# Patient Record
Sex: FEMALE | Race: WHITE | NOT HISPANIC OR LATINO
[De-identification: names, ages, dates, MRNs, and addresses within clinical notes are randomized per-mention and may not be internally consistent; named-entity substitution may affect disease eponyms.]

---

## 2017-10-18 ENCOUNTER — TRANSCRIPTION ENCOUNTER (OUTPATIENT)
Age: 32
End: 2017-10-18

## 2020-05-14 ENCOUNTER — INPATIENT (INPATIENT)
Facility: HOSPITAL | Age: 35
LOS: 0 days | Discharge: HOME | End: 2020-05-15
Attending: OBSTETRICS & GYNECOLOGY | Admitting: OBSTETRICS & GYNECOLOGY
Payer: COMMERCIAL

## 2020-05-14 VITALS — WEIGHT: 163.14 LBS | HEIGHT: 63 IN

## 2020-05-14 DIAGNOSIS — O48.1 PROLONGED PREGNANCY: ICD-10-CM

## 2020-05-14 DIAGNOSIS — Z3A.39 39 WEEKS GESTATION OF PREGNANCY: ICD-10-CM

## 2020-05-14 LAB
BASOPHILS # BLD AUTO: 0.02 K/UL — SIGNIFICANT CHANGE UP (ref 0–0.2)
BASOPHILS NFR BLD AUTO: 0.2 % — SIGNIFICANT CHANGE UP (ref 0–2)
BLD GP AB SCN SERPL QL: NEGATIVE — SIGNIFICANT CHANGE UP
EOSINOPHIL # BLD AUTO: 0.05 K/UL — SIGNIFICANT CHANGE UP (ref 0–0.5)
EOSINOPHIL NFR BLD AUTO: 0.6 % — SIGNIFICANT CHANGE UP (ref 0–6)
HCT VFR BLD CALC: 38.1 % — SIGNIFICANT CHANGE UP (ref 34.5–45)
HGB BLD-MCNC: 12.6 G/DL — SIGNIFICANT CHANGE UP (ref 11.5–15.5)
IMM GRANULOCYTES NFR BLD AUTO: 0.4 % — SIGNIFICANT CHANGE UP (ref 0–1.5)
LYMPHOCYTES # BLD AUTO: 2.38 K/UL — SIGNIFICANT CHANGE UP (ref 1–3.3)
LYMPHOCYTES # BLD AUTO: 26.5 % — SIGNIFICANT CHANGE UP (ref 13–44)
MCHC RBC-ENTMCNC: 29.2 PG — SIGNIFICANT CHANGE UP (ref 27–34)
MCHC RBC-ENTMCNC: 33.1 GM/DL — SIGNIFICANT CHANGE UP (ref 32–36)
MCV RBC AUTO: 88.4 FL — SIGNIFICANT CHANGE UP (ref 80–100)
MONOCYTES # BLD AUTO: 0.87 K/UL — SIGNIFICANT CHANGE UP (ref 0–0.9)
MONOCYTES NFR BLD AUTO: 9.7 % — SIGNIFICANT CHANGE UP (ref 2–14)
NEUTROPHILS # BLD AUTO: 5.63 K/UL — SIGNIFICANT CHANGE UP (ref 1.8–7.4)
NEUTROPHILS NFR BLD AUTO: 62.6 % — SIGNIFICANT CHANGE UP (ref 43–77)
NRBC # BLD: 0 /100 WBCS — SIGNIFICANT CHANGE UP (ref 0–0)
PLATELET # BLD AUTO: 132 K/UL — LOW (ref 150–400)
RBC # BLD: 4.31 M/UL — SIGNIFICANT CHANGE UP (ref 3.8–5.2)
RBC # FLD: 13.2 % — SIGNIFICANT CHANGE UP (ref 10.3–14.5)
RH IG SCN BLD-IMP: POSITIVE — SIGNIFICANT CHANGE UP
SARS-COV-2 RNA SPEC QL NAA+PROBE: SIGNIFICANT CHANGE UP
T PALLIDUM AB TITR SER: NEGATIVE — SIGNIFICANT CHANGE UP
WBC # BLD: 8.99 K/UL — SIGNIFICANT CHANGE UP (ref 3.8–10.5)
WBC # FLD AUTO: 8.99 K/UL — SIGNIFICANT CHANGE UP (ref 3.8–10.5)

## 2020-05-14 RX ORDER — ACETAMINOPHEN 500 MG
975 TABLET ORAL
Refills: 0 | Status: DISCONTINUED | OUTPATIENT
Start: 2020-05-14 | End: 2020-05-15

## 2020-05-14 RX ORDER — SODIUM CHLORIDE 9 MG/ML
1000 INJECTION, SOLUTION INTRAVENOUS
Refills: 0 | Status: DISCONTINUED | OUTPATIENT
Start: 2020-05-14 | End: 2020-05-14

## 2020-05-14 RX ORDER — OXYCODONE HYDROCHLORIDE 5 MG/1
5 TABLET ORAL
Refills: 0 | Status: DISCONTINUED | OUTPATIENT
Start: 2020-05-14 | End: 2020-05-15

## 2020-05-14 RX ORDER — PRAMOXINE HYDROCHLORIDE 150 MG/15G
1 AEROSOL, FOAM RECTAL EVERY 4 HOURS
Refills: 0 | Status: DISCONTINUED | OUTPATIENT
Start: 2020-05-14 | End: 2020-05-15

## 2020-05-14 RX ORDER — SODIUM CHLORIDE 9 MG/ML
3 INJECTION INTRAMUSCULAR; INTRAVENOUS; SUBCUTANEOUS EVERY 8 HOURS
Refills: 0 | Status: DISCONTINUED | OUTPATIENT
Start: 2020-05-14 | End: 2020-05-15

## 2020-05-14 RX ORDER — CITRIC ACID/SODIUM CITRATE 300-500 MG
5 SOLUTION, ORAL ORAL ONCE
Refills: 0 | Status: DISCONTINUED | OUTPATIENT
Start: 2020-05-14 | End: 2020-05-14

## 2020-05-14 RX ORDER — TETANUS TOXOID, REDUCED DIPHTHERIA TOXOID AND ACELLULAR PERTUSSIS VACCINE, ADSORBED 5; 2.5; 8; 8; 2.5 [IU]/.5ML; [IU]/.5ML; UG/.5ML; UG/.5ML; UG/.5ML
0.5 SUSPENSION INTRAMUSCULAR ONCE
Refills: 0 | Status: DISCONTINUED | OUTPATIENT
Start: 2020-05-14 | End: 2020-05-15

## 2020-05-14 RX ORDER — AER TRAVELER 0.5 G/1
1 SOLUTION RECTAL; TOPICAL EVERY 4 HOURS
Refills: 0 | Status: DISCONTINUED | OUTPATIENT
Start: 2020-05-14 | End: 2020-05-15

## 2020-05-14 RX ORDER — MAGNESIUM HYDROXIDE 400 MG/1
30 TABLET, CHEWABLE ORAL
Refills: 0 | Status: DISCONTINUED | OUTPATIENT
Start: 2020-05-14 | End: 2020-05-15

## 2020-05-14 RX ORDER — OXYCODONE HYDROCHLORIDE 5 MG/1
5 TABLET ORAL ONCE
Refills: 0 | Status: DISCONTINUED | OUTPATIENT
Start: 2020-05-14 | End: 2020-05-15

## 2020-05-14 RX ORDER — IBUPROFEN 200 MG
600 TABLET ORAL EVERY 6 HOURS
Refills: 0 | Status: COMPLETED | OUTPATIENT
Start: 2020-05-14 | End: 2021-04-12

## 2020-05-14 RX ORDER — DIPHENHYDRAMINE HCL 50 MG
25 CAPSULE ORAL EVERY 6 HOURS
Refills: 0 | Status: DISCONTINUED | OUTPATIENT
Start: 2020-05-14 | End: 2020-05-15

## 2020-05-14 RX ORDER — OXYTOCIN 10 UNIT/ML
333.33 VIAL (ML) INJECTION
Qty: 20 | Refills: 0 | Status: DISCONTINUED | OUTPATIENT
Start: 2020-05-14 | End: 2020-05-15

## 2020-05-14 RX ORDER — KETOROLAC TROMETHAMINE 30 MG/ML
30 SYRINGE (ML) INJECTION ONCE
Refills: 0 | Status: DISCONTINUED | OUTPATIENT
Start: 2020-05-14 | End: 2020-05-14

## 2020-05-14 RX ORDER — LANOLIN
1 OINTMENT (GRAM) TOPICAL EVERY 6 HOURS
Refills: 0 | Status: DISCONTINUED | OUTPATIENT
Start: 2020-05-14 | End: 2020-05-15

## 2020-05-14 RX ORDER — HYDROCORTISONE 1 %
1 OINTMENT (GRAM) TOPICAL EVERY 6 HOURS
Refills: 0 | Status: DISCONTINUED | OUTPATIENT
Start: 2020-05-14 | End: 2020-05-15

## 2020-05-14 RX ORDER — OXYTOCIN 10 UNIT/ML
4 VIAL (ML) INJECTION
Qty: 30 | Refills: 0 | Status: DISCONTINUED | OUTPATIENT
Start: 2020-05-14 | End: 2020-05-14

## 2020-05-14 RX ORDER — OXYTOCIN 10 UNIT/ML
2 VIAL (ML) INJECTION
Qty: 30 | Refills: 0 | Status: DISCONTINUED | OUTPATIENT
Start: 2020-05-14 | End: 2020-05-15

## 2020-05-14 RX ORDER — SIMETHICONE 80 MG/1
80 TABLET, CHEWABLE ORAL EVERY 4 HOURS
Refills: 0 | Status: DISCONTINUED | OUTPATIENT
Start: 2020-05-14 | End: 2020-05-15

## 2020-05-14 RX ORDER — BENZOCAINE 10 %
1 GEL (GRAM) MUCOUS MEMBRANE EVERY 6 HOURS
Refills: 0 | Status: DISCONTINUED | OUTPATIENT
Start: 2020-05-14 | End: 2020-05-15

## 2020-05-14 RX ORDER — DIBUCAINE 1 %
1 OINTMENT (GRAM) RECTAL EVERY 6 HOURS
Refills: 0 | Status: DISCONTINUED | OUTPATIENT
Start: 2020-05-14 | End: 2020-05-15

## 2020-05-14 RX ORDER — CITRIC ACID/SODIUM CITRATE 300-500 MG
15 SOLUTION, ORAL ORAL EVERY 6 HOURS
Refills: 0 | Status: DISCONTINUED | OUTPATIENT
Start: 2020-05-14 | End: 2020-05-14

## 2020-05-14 RX ADMIN — Medication 30 MILLIGRAM(S): at 20:24

## 2020-05-14 RX ADMIN — Medication 15 MILLILITER(S): at 04:10

## 2020-05-14 RX ADMIN — Medication 1000 MILLIUNIT(S)/MIN: at 18:18

## 2020-05-14 RX ADMIN — Medication 2 MILLIUNIT(S)/MIN: at 08:57

## 2020-05-15 ENCOUNTER — TRANSCRIPTION ENCOUNTER (OUTPATIENT)
Age: 35
End: 2020-05-15

## 2020-05-15 VITALS
TEMPERATURE: 98 F | SYSTOLIC BLOOD PRESSURE: 95 MMHG | HEART RATE: 66 BPM | OXYGEN SATURATION: 98 % | DIASTOLIC BLOOD PRESSURE: 61 MMHG | RESPIRATION RATE: 18 BRPM

## 2020-05-15 PROCEDURE — 59025 FETAL NON-STRESS TEST: CPT

## 2020-05-15 PROCEDURE — 86850 RBC ANTIBODY SCREEN: CPT

## 2020-05-15 PROCEDURE — 86780 TREPONEMA PALLIDUM: CPT

## 2020-05-15 PROCEDURE — 86901 BLOOD TYPING SEROLOGIC RH(D): CPT

## 2020-05-15 PROCEDURE — 59050 FETAL MONITOR W/REPORT: CPT

## 2020-05-15 PROCEDURE — 86900 BLOOD TYPING SEROLOGIC ABO: CPT

## 2020-05-15 PROCEDURE — 85027 COMPLETE CBC AUTOMATED: CPT

## 2020-05-15 RX ORDER — IBUPROFEN 200 MG
1 TABLET ORAL
Qty: 0 | Refills: 0 | DISCHARGE
Start: 2020-05-15

## 2020-05-15 RX ORDER — IBUPROFEN 200 MG
600 TABLET ORAL EVERY 6 HOURS
Refills: 0 | Status: DISCONTINUED | OUTPATIENT
Start: 2020-05-15 | End: 2020-05-15

## 2020-05-15 RX ORDER — ACETAMINOPHEN 500 MG
3 TABLET ORAL
Qty: 0 | Refills: 0 | DISCHARGE
Start: 2020-05-15

## 2020-05-15 RX ADMIN — Medication 975 MILLIGRAM(S): at 06:29

## 2020-05-15 RX ADMIN — Medication 975 MILLIGRAM(S): at 17:19

## 2020-05-15 RX ADMIN — Medication 1 TABLET(S): at 12:25

## 2020-05-15 RX ADMIN — Medication 600 MILLIGRAM(S): at 15:20

## 2020-05-15 RX ADMIN — SIMETHICONE 80 MILLIGRAM(S): 80 TABLET, CHEWABLE ORAL at 12:25

## 2020-05-15 RX ADMIN — Medication 975 MILLIGRAM(S): at 12:24

## 2020-05-15 RX ADMIN — Medication 600 MILLIGRAM(S): at 08:22

## 2020-05-15 RX ADMIN — Medication 600 MILLIGRAM(S): at 03:37

## 2020-05-15 NOTE — DISCHARGE NOTE OB - PATIENT PORTAL LINK FT
You can access the FollowMyHealth Patient Portal offered by [facility] by registering at the following website: [email or web address]. By joining [facility]’s FollowMyHealth portal, you will also be able to view your health information using other applications (apps) compatible with our system.

## 2020-05-15 NOTE — DISCHARGE NOTE OB - HOSPITAL COURSE
Patient was admitted to L+D for IOL, Pt had an uncomplicated  followed by an uncomplicated postpartum course.  EBL:200   Hct:38.1  On Postpartum day 1, patient was discharged home in stable condition, voiding spontaneously, pain well controlled, ambulating, tolerating PO and with normal vital signs.   Pt plans to follow up in the office in 6 weeks. Telephone number and clinic information provided prior to discharge.

## 2020-05-15 NOTE — DISCHARGE NOTE OB - MATERIALS PROVIDED
[facility] Feeding Log/[facility] Hearing Screen Program/Breastfeeding Log/Back To Sleep Handout/Discharge Medication Information for Patients and Families Pocket Guide/Breastfeeding Mother’s Support Group Information/Guide to Postpartum Care/Shaken Baby Prevention Handout/Breastfeeding Guide and Packet/Birth Certificate Instructions

## 2020-05-15 NOTE — DISCHARGE NOTE OB - CARE PROVIDER_API CALL
Shira Bernal  OBSTETRICS AND GYNECOLOGY  [address] [place] [address]  Phone: [phone]  Fax: [phone]  Follow Up Time:

## 2020-05-15 NOTE — DISCHARGE NOTE OB - CARE PLAN
Principal Discharge DX:	Vaginal delivery  Goal:	Recovery  Assessment and plan of treatment:	follow up in the office in six weeks; please call to confirm your appointment; regular diet; pain control with motrin and tylenol; nothing per vagina; no heavy lifting; call with any concerns or questions

## 2020-05-15 NOTE — PROGRESS NOTE ADULT - SUBJECTIVE AND OBJECTIVE BOX
PPD#1  after full term IOL.    S: Patient doing well. Minimal lochia. Pain controlled. Ambulating. Tolerating PO. Voiding. Passing flatus.  Denies CP, SOB, N/V, dizziness, palpitations.    O: Vital Signs Last 24 Hrs  T(C): 36.6 (15 May 2020 06:26), Max: 37.3 (14 May 2020 17:15)  T(F): 97.9 (15 May 2020 06:26), Max: 99.1 (14 May 2020 17:15)  HR: 68 (15 May 2020 06:26) (66 - 84)  BP: 96/62 (15 May 2020 06:26) (94/51 - 105/57)  BP(mean): 76 (14 May 2020 19:30) (71 - 76)  RR: 20 (15 May 2020 06:26) (15 - 20)  SpO2: 98% (15 May 2020 06:26) (94% - 98%)    Gen: NAD  Abd: soft, NT, ND, fundus firm below umbilicus  Lochia: moderate  Ext: no tenderness, mild edema bilaterally    Labs:                        12.6   8.99  )-----------( 132      ( 14 May 2020 05:27 )             38.1       COVID-19 PCR . (20 @ 05:37)    COVID-19 PCR: NotDetec: This test has been validated by [employer] to be accurate;  though it has not been FDA cleared/approved by the usual pathway.  As with all laboratory tests, results should be correlated with clinical  findings.  https://www.fda.gov/media/036696/download  https://www.fda.gov/media/143433/download    Type + Screen (20 @ 05:26)    ABO Interpretation: B    Rh Interpretation: Positive    Antibody Screen: Negative      MEDICATIONS  (STANDING):  acetaminophen   Tablet .. 975 milliGRAM(s) Oral <User Schedule>  diphtheria/tetanus/pertussis (acellular) Vaccine (ADAcel) 0.5 milliLiter(s) IntraMuscular once  ibuprofen  Tablet. 600 milliGRAM(s) Oral every 6 hours  oxytocin Infusion 2 milliUNIT(s)/Min (2 mL/Hr) IV Continuous <Continuous>  oxytocin Infusion 333.333 milliUNIT(s)/Min (1000 mL/Hr) IV Continuous <Continuous>  oxytocin Infusion 333.333 milliUNIT(s)/Min (1000 mL/Hr) IV Continuous <Continuous>  prenatal multivitamin 1 Tablet(s) Oral daily  sodium chloride 0.9% lock flush 3 milliLiter(s) IV Push every 8 hours    MEDICATIONS  (PRN):  benzocaine 20%/menthol 0.5% Spray 1 Spray(s) Topical every 6 hours PRN for Perineal discomfort  dibucaine 1% Ointment 1 Application(s) Topical every 6 hours PRN Perineal discomfort  diphenhydrAMINE 25 milliGRAM(s) Oral every 6 hours PRN Pruritus  hydrocortisone 1% Cream 1 Application(s) Topical every 6 hours PRN Moderate Pain (4-6)  lanolin Ointment 1 Application(s) Topical every 6 hours PRN nipple soreness  magnesium hydroxide Suspension 30 milliLiter(s) Oral two times a day PRN Constipation  oxyCODONE    IR 5 milliGRAM(s) Oral every 3 hours PRN Moderate to Severe Pain (4-10)  oxyCODONE    IR 5 milliGRAM(s) Oral once PRN Moderate to Severe Pain (4-10)  pramoxine 1%/zinc 5% Cream 1 Application(s) Topical every 4 hours PRN Moderate Pain (4-6)  simethicone 80 milliGRAM(s) Chew every 4 hours PRN Gas  witch hazel Pads 1 Application(s) Topical every 4 hours PRN Perineal discomfort

## 2020-05-15 NOTE — PROGRESS NOTE ADULT - PROBLEM SELECTOR PLAN 1
Plan: Pt doing well, routine care  Continue the current pain medication  Encourage  Ambulation  Encourage regular diet  passing all milestones  Plan for discharged according to the normal criteria.    Edith HILL

## 2021-04-20 NOTE — PATIENT PROFILE OB - EDD BY ULTRASOUND, OB PROFILE
Goal Outcome Evaluation:  Plan of Care Reviewed With: patient     Outcome Summary: Pt slept well throughout the night, pt remains bradycardic but BP elevated. Pt given IV Vasotec for BP. Cardio and Nephrology on board, will continue to monitor.   16-May-2020

## 2022-09-13 ENCOUNTER — APPOINTMENT (OUTPATIENT)
Dept: INTERNAL MEDICINE | Facility: CLINIC | Age: 37
End: 2022-09-13

## 2022-09-13 ENCOUNTER — NON-APPOINTMENT (OUTPATIENT)
Age: 37
End: 2022-09-13

## 2022-09-13 VITALS
HEIGHT: 63 IN | WEIGHT: 134 LBS | HEART RATE: 66 BPM | BODY MASS INDEX: 23.74 KG/M2 | OXYGEN SATURATION: 98 % | TEMPERATURE: 97.6 F

## 2022-09-13 VITALS — DIASTOLIC BLOOD PRESSURE: 70 MMHG | SYSTOLIC BLOOD PRESSURE: 100 MMHG

## 2022-09-13 DIAGNOSIS — Z01.818 ENCOUNTER FOR OTHER PREPROCEDURAL EXAMINATION: ICD-10-CM

## 2022-09-13 DIAGNOSIS — Z78.9 OTHER SPECIFIED HEALTH STATUS: ICD-10-CM

## 2022-09-13 DIAGNOSIS — Z23 ENCOUNTER FOR IMMUNIZATION: ICD-10-CM

## 2022-09-13 PROCEDURE — 90686 IIV4 VACC NO PRSV 0.5 ML IM: CPT

## 2022-09-13 PROCEDURE — 90471 IMMUNIZATION ADMIN: CPT

## 2022-09-13 PROCEDURE — 99204 OFFICE O/P NEW MOD 45 MIN: CPT | Mod: 25

## 2022-09-13 PROCEDURE — 93000 ELECTROCARDIOGRAM COMPLETE: CPT

## 2022-09-13 PROCEDURE — 36415 COLL VENOUS BLD VENIPUNCTURE: CPT

## 2022-09-13 NOTE — REVIEW OF SYSTEMS
[Fever] : no fever [Chills] : no chills [Chest Pain] : no chest pain [Palpitations] : no palpitations [Lower Ext Edema] : no lower extremity edema [Shortness Of Breath] : no shortness of breath [Wheezing] : no wheezing [Cough] : no cough [Abdominal Pain] : no abdominal pain [Dysuria] : no dysuria [Joint Pain] : no joint pain [Joint Stiffness] : no joint stiffness

## 2022-09-14 LAB
ALBUMIN SERPL ELPH-MCNC: 4.4 G/DL
ALP BLD-CCNC: 25 U/L
ALT SERPL-CCNC: 13 U/L
ANION GAP SERPL CALC-SCNC: 14 MMOL/L
APPEARANCE: CLEAR
AST SERPL-CCNC: 19 U/L
BACTERIA: NEGATIVE
BASOPHILS # BLD AUTO: 0.04 K/UL
BASOPHILS NFR BLD AUTO: 0.7 %
BILIRUB SERPL-MCNC: 0.4 MG/DL
BILIRUBIN URINE: NEGATIVE
BLOOD URINE: ABNORMAL
BUN SERPL-MCNC: 9 MG/DL
CALCIUM SERPL-MCNC: 9.6 MG/DL
CHLORIDE SERPL-SCNC: 106 MMOL/L
CO2 SERPL-SCNC: 22 MMOL/L
COLOR: NORMAL
CREAT SERPL-MCNC: 0.86 MG/DL
EGFR: 89 ML/MIN/1.73M2
EOSINOPHIL # BLD AUTO: 0.05 K/UL
EOSINOPHIL NFR BLD AUTO: 0.8 %
GLUCOSE QUALITATIVE U: NORMAL
GLUCOSE SERPL-MCNC: 87 MG/DL
HCG SERPL QL: NEGATIVE
HCT VFR BLD CALC: 44.7 %
HGB BLD-MCNC: 14.2 G/DL
HYALINE CASTS: 1 /LPF
IMM GRANULOCYTES NFR BLD AUTO: 0.2 %
INR PPP: 0.99 RATIO
KETONES URINE: NEGATIVE
LEUKOCYTE ESTERASE URINE: NEGATIVE
LYMPHOCYTES # BLD AUTO: 2.36 K/UL
LYMPHOCYTES NFR BLD AUTO: 39.5 %
MAN DIFF?: NORMAL
MCHC RBC-ENTMCNC: 31.8 GM/DL
MCHC RBC-ENTMCNC: 32.1 PG
MCV RBC AUTO: 101.1 FL
MICROSCOPIC-UA: NORMAL
MONOCYTES # BLD AUTO: 0.44 K/UL
MONOCYTES NFR BLD AUTO: 7.4 %
NEUTROPHILS # BLD AUTO: 3.08 K/UL
NEUTROPHILS NFR BLD AUTO: 51.4 %
NITRITE URINE: NEGATIVE
PAPP-A SERPL-ACNC: <1 MIU/ML
PH URINE: 8
PLATELET # BLD AUTO: 192 K/UL
POTASSIUM SERPL-SCNC: 4.6 MMOL/L
PROT SERPL-MCNC: 6.5 G/DL
PROTEIN URINE: NORMAL
PT BLD: 11.7 SEC
RBC # BLD: 4.42 M/UL
RBC # FLD: 13.7 %
RED BLOOD CELLS URINE: 10 /HPF
SODIUM SERPL-SCNC: 143 MMOL/L
SPECIFIC GRAVITY URINE: 1.02
SQUAMOUS EPITHELIAL CELLS: 3 /HPF
URINE COMMENTS: NORMAL
UROBILINOGEN URINE: NORMAL
WBC # FLD AUTO: 5.98 K/UL
WHITE BLOOD CELLS URINE: 0 /HPF